# Patient Record
Sex: FEMALE | Race: ASIAN | NOT HISPANIC OR LATINO | URBAN - METROPOLITAN AREA
[De-identification: names, ages, dates, MRNs, and addresses within clinical notes are randomized per-mention and may not be internally consistent; named-entity substitution may affect disease eponyms.]

---

## 2024-02-09 ENCOUNTER — OFFICE (OUTPATIENT)
Dept: URBAN - METROPOLITAN AREA CLINIC 11 | Facility: CLINIC | Age: 24
End: 2024-02-09

## 2024-02-09 VITALS
SYSTOLIC BLOOD PRESSURE: 137 MMHG | SYSTOLIC BLOOD PRESSURE: 163 MMHG | HEART RATE: 91 BPM | HEIGHT: 59 IN | OXYGEN SATURATION: 94 % | DIASTOLIC BLOOD PRESSURE: 108 MMHG | WEIGHT: 163 LBS | DIASTOLIC BLOOD PRESSURE: 100 MMHG

## 2024-02-09 DIAGNOSIS — R63.4 ABNORMAL WEIGHT LOSS: ICD-10-CM

## 2024-02-09 DIAGNOSIS — R10.13 EPIGASTRIC PAIN: ICD-10-CM

## 2024-02-09 DIAGNOSIS — R10.30 LOWER ABDOMINAL PAIN, UNSPECIFIED: ICD-10-CM

## 2024-02-09 DIAGNOSIS — K62.89 OTHER SPECIFIED DISEASES OF ANUS AND RECTUM: ICD-10-CM

## 2024-02-09 DIAGNOSIS — K92.1 MELENA: ICD-10-CM

## 2024-02-09 DIAGNOSIS — K61.0 ANAL ABSCESS: ICD-10-CM

## 2024-02-09 DIAGNOSIS — K60.2 ANAL FISSURE, UNSPECIFIED: ICD-10-CM

## 2024-02-09 PROCEDURE — 99204 OFFICE O/P NEW MOD 45 MIN: CPT | Performed by: STUDENT IN AN ORGANIZED HEALTH CARE EDUCATION/TRAINING PROGRAM

## 2024-02-09 RX ORDER — SODIUM PICOSULFATE, MAGNESIUM OXIDE, AND ANHYDROUS CITRIC ACID 10; 3.5; 12 MG/160ML; G/160ML; G/160ML
LIQUID ORAL
Qty: 350 | Refills: 0 | Status: COMPLETED
Start: 2024-02-09 | End: 2024-02-29

## 2024-02-09 RX ORDER — FAMOTIDINE 20 MG/1
TABLET, FILM COATED ORAL
Qty: 28 | Refills: 0 | Status: COMPLETED
Start: 2024-02-09 | End: 2024-02-29

## 2024-02-09 NOTE — SERVICEHPINOTES
Ms. Nati Mayen is a  23-year-old female with no significant past medical history who presents to gastro 1 with multiple GI issues including anorectal pain, bleeding, early satiety, diarrhea, upper and lower abdominal pain.
jenny
br clinic visit 02/09/2024: 
br the patient reports that ever since Christmas she has had upper and lower abdominal pain as well as diarrhea after every time she eats.  She has lost 10 pounds unintentionally.  She endorses nausea but no vomiting, early satiety.  She was started on pantoprazole for the last 3 weeks which has not helped much.  She also failed using hyoscyamine by her primary care doctor.  She also reports her most distressing symptom today is anorectal pain that is worse at night over the last 3 days.  She also endorses blood in her stool every once in a while since Christmas, diarrhea after every meal as well as upper abdominal and lower abdominal cramping.  She does not take any significant NSAIDs.  No family history of GI malignancies or liver disease.  No previous abdominal surgeries.  She does not smoke, drink alcohol, or use recreational drugs.  She was on antibiotics when her symptoms 1st started but those have since been discontinued.

## 2024-02-09 NOTE — SERVICENOTES
patient has multiple issues going on today.  her most distressing symptom is anorectal pain that I think is probably from an anal fissure with a superficial abscess or possibly just the fissure itself.  I am going to treat her empirically with nitroglycerin topical cream for 8 weeks.  Were also going to schedule her for diagnostic colonoscopy for her hematochezia and anorectal pain.  I told her that if she develops fevers or chills to call us or go to the emergency room as it could mean a worsening abscess.  I am going to check a CT scan of the pelvis to evaluate for any underlying perianal abscess.  Will also do an EGD due to her upper abdominal and epigastric pain and early satiety in the setting of symptoms despite pantoprazole use.  Will switch her to famotidine 2 weeks prior to the procedure.  I discussed with her the risks and benefits of the procedure including bleeding, infection, anesthesia related complications. Patient agrees proceed with the planned procedure.

## 2024-02-10 ENCOUNTER — INPATIENT HOSPITAL (OUTPATIENT)
Dept: URBAN - METROPOLITAN AREA HOSPITAL 95 | Facility: HOSPITAL | Age: 24
End: 2024-02-10

## 2024-02-10 ENCOUNTER — EMERGENCY ROOM – HOSPITAL (OUTPATIENT)
Dept: URBAN - METROPOLITAN AREA HOSPITAL 96 | Facility: HOSPITAL | Age: 24
End: 2024-02-10
Payer: COMMERCIAL

## 2024-02-10 DIAGNOSIS — K51.00 ULCERATIVE (CHRONIC) PANCOLITIS WITHOUT COMPLICATIONS: ICD-10-CM

## 2024-02-10 DIAGNOSIS — A04.72 ENTEROCOLITIS DUE TO CLOSTRIDIUM DIFFICILE, NOT SPECIFIED AS: ICD-10-CM

## 2024-02-10 PROCEDURE — 99222 1ST HOSP IP/OBS MODERATE 55: CPT | Performed by: INTERNAL MEDICINE

## 2024-02-11 ENCOUNTER — INPATIENT HOSPITAL (OUTPATIENT)
Dept: URBAN - METROPOLITAN AREA HOSPITAL 95 | Facility: HOSPITAL | Age: 24
End: 2024-02-11

## 2024-02-11 DIAGNOSIS — K52.9 NONINFECTIVE GASTROENTERITIS AND COLITIS, UNSPECIFIED: ICD-10-CM

## 2024-02-11 DIAGNOSIS — A04.72 ENTEROCOLITIS DUE TO CLOSTRIDIUM DIFFICILE, NOT SPECIFIED AS: ICD-10-CM

## 2024-02-11 PROCEDURE — 99232 SBSQ HOSP IP/OBS MODERATE 35: CPT | Performed by: INTERNAL MEDICINE

## 2024-02-12 ENCOUNTER — INPATIENT HOSPITAL (OUTPATIENT)
Dept: URBAN - METROPOLITAN AREA HOSPITAL 95 | Facility: HOSPITAL | Age: 24
End: 2024-02-12

## 2024-02-12 DIAGNOSIS — A04.72 ENTEROCOLITIS DUE TO CLOSTRIDIUM DIFFICILE, NOT SPECIFIED AS: ICD-10-CM

## 2024-02-12 DIAGNOSIS — K52.9 NONINFECTIVE GASTROENTERITIS AND COLITIS, UNSPECIFIED: ICD-10-CM

## 2024-02-12 PROCEDURE — 99231 SBSQ HOSP IP/OBS SF/LOW 25: CPT | Performed by: INTERNAL MEDICINE

## 2024-02-13 ENCOUNTER — INPATIENT HOSPITAL (OUTPATIENT)
Dept: URBAN - METROPOLITAN AREA HOSPITAL 95 | Facility: HOSPITAL | Age: 24
End: 2024-02-13

## 2024-02-13 DIAGNOSIS — A04.72 ENTEROCOLITIS DUE TO CLOSTRIDIUM DIFFICILE, NOT SPECIFIED AS: ICD-10-CM

## 2024-02-13 DIAGNOSIS — K52.9 NONINFECTIVE GASTROENTERITIS AND COLITIS, UNSPECIFIED: ICD-10-CM

## 2024-02-13 PROCEDURE — 99231 SBSQ HOSP IP/OBS SF/LOW 25: CPT | Performed by: INTERNAL MEDICINE

## 2024-02-14 ENCOUNTER — INPATIENT HOSPITAL (OUTPATIENT)
Dept: URBAN - METROPOLITAN AREA HOSPITAL 95 | Facility: HOSPITAL | Age: 24
End: 2024-02-14

## 2024-02-14 DIAGNOSIS — A04.72 ENTEROCOLITIS DUE TO CLOSTRIDIUM DIFFICILE, NOT SPECIFIED AS: ICD-10-CM

## 2024-02-14 PROCEDURE — 99231 SBSQ HOSP IP/OBS SF/LOW 25: CPT | Performed by: INTERNAL MEDICINE

## 2024-02-29 ENCOUNTER — OFFICE (OUTPATIENT)
Dept: URBAN - METROPOLITAN AREA CLINIC 11 | Facility: CLINIC | Age: 24
End: 2024-02-29

## 2024-02-29 VITALS
WEIGHT: 163 LBS | HEIGHT: 59 IN | DIASTOLIC BLOOD PRESSURE: 91 MMHG | SYSTOLIC BLOOD PRESSURE: 139 MMHG | OXYGEN SATURATION: 99 % | HEART RATE: 86 BPM

## 2024-02-29 DIAGNOSIS — R11.0 NAUSEA: ICD-10-CM

## 2024-02-29 DIAGNOSIS — D50.9 IRON DEFICIENCY ANEMIA, UNSPECIFIED: ICD-10-CM

## 2024-02-29 DIAGNOSIS — Z86.19 PERSONAL HISTORY OF OTHER INFECTIOUS AND PARASITIC DISEASES: ICD-10-CM

## 2024-02-29 DIAGNOSIS — R10.32 LEFT LOWER QUADRANT PAIN: ICD-10-CM

## 2024-02-29 DIAGNOSIS — K60.2 ANAL FISSURE, UNSPECIFIED: ICD-10-CM

## 2024-02-29 DIAGNOSIS — R93.3 ABNORMAL FINDINGS ON DIAGNOSTIC IMAGING OF OTHER PARTS OF DI: ICD-10-CM

## 2024-02-29 DIAGNOSIS — K52.89 OTHER SPECIFIED NONINFECTIVE GASTROENTERITIS AND COLITIS: ICD-10-CM

## 2024-02-29 PROCEDURE — 99214 OFFICE O/P EST MOD 30 MIN: CPT | Performed by: STUDENT IN AN ORGANIZED HEALTH CARE EDUCATION/TRAINING PROGRAM

## 2024-02-29 RX ORDER — HYOSCYAMINE SULFATE 0.12 MG/1
TABLET ORAL; SUBLINGUAL
Qty: 30 | Refills: 3 | Status: COMPLETED
Start: 2024-02-29 | End: 2024-05-29

## 2024-02-29 RX ORDER — DICYCLOMINE HYDROCHLORIDE 20 MG/1
80 TABLET ORAL
Qty: 30 | Refills: 0 | Status: COMPLETED
End: 2024-02-29

## 2024-02-29 RX ORDER — ONDANSETRON 4 MG/1
TABLET, ORALLY DISINTEGRATING ORAL
Qty: 20 | Refills: 1 | Status: COMPLETED
Start: 2024-02-29 | End: 2024-05-29

## 2024-02-29 NOTE — SERVICEHPINOTES
Ms. Nati Mayen is a 23-year-old female with no significant past medical history who initially presented to gastro 1 with multiple GI issues including anorectal pain, bleeding, early satiety, diarrhea, upper and lower abdominal pain.clinic visit 02/09/2024: brthe patient reports that ever since Christmas she has had upper and lower abdominal pain as well as diarrhea after every time she eats.  She has lost 10 pounds unintentionally.  She endorses nausea but no vomiting, early satiety.  She was started on pantoprazole for the last 3 weeks which has not helped much.  She also failed using hyoscyamine by her primary care doctor.  She also reports her most distressing symptom today is anorectal pain that is worse at night over the last 3 days.  She also endorses blood in her stool every once in a while since Christmas, diarrhea after every meal as well as upper abdominal and lower abdominal cramping.  She does not take any significant NSAIDs.  No family history of GI malignancies or liver disease.  No previous abdominal surgeries.  She does not smoke, drink alcohol, or use recreational drugs.  She was on antibiotics when her symptoms 1st started but those have since been discontinued. 
br
br     Clinic visit 02/29/2024:
br 
 patient reports that after I saw her in clinic on February 9th she went to the emergency room at [facility].  There she had a CT scan which showed pan colitis but no abscess.  She was seen by the GI service there and had stool testing done which was positive for C diff.  She was admitted for 5 days and discharged after her symptoms improved.  Since being in the hospital she has had improvement overall in her symptoms although she is still having about 6 loose bowel movements per day, less blood, still has some cramping but it is overall improved.  She has having no fevers or chills.  Some occasional nausea.  She stopped using the anal fissure cream that I gave her last time and reports that she has not having significant pain when she has a bowel movement.  She was also found to have iron-deficiency anemia in the hospital with a hemoglobin around 7 and low ferritin and iron levels so she was given IV iron.  She was using Bentyl around the clock after discharge but had no improvement in her symptoms.  She felt like a PPI is not helping her so she stopped taking it.

## 2024-02-29 NOTE — SERVICENOTES
Patient has recent C diff infection that has been treated.  Patient's abdominal cramping and overall symptoms are much better but she is still having 6 bowel movements per day.  I am going to switch her from Bentyl to p.r.n. hyoscyamine for abdominal cramping and Zofran as needed for nausea.  She can stop the PPI since it does not seem to be helping and may increase her risk of recurrent C diff.  Return to clinic in 3 months.  We will reschedule her EGD and colonoscopy to give her about 6 weeks after she was completed treatment for C diff to allow healing.  I recommended that if her anal fissure recurs then she should start using the nitroglycerin cream again.  I personally reviewed her outside medical records from [facility] for her visit today.  All questions addressed.  I told her to call us back if her diarrhea is worsening, bleeding is worsening, or cramping is worsening as we may need to retest her for C diff and treat her for a more prolonged course or with fidaxomicin if needed.

## 2024-03-26 ENCOUNTER — AMBULATORY SURGICAL CENTER (OUTPATIENT)
Dept: URBAN - METROPOLITAN AREA SURGERY 3 | Facility: SURGERY | Age: 24
End: 2024-03-26
Payer: COMMERCIAL

## 2024-03-26 ENCOUNTER — AMBULATORY SURGICAL CENTER (OUTPATIENT)
Dept: URBAN - METROPOLITAN AREA SURGERY 3 | Facility: SURGERY | Age: 24
End: 2024-03-26

## 2024-03-26 ENCOUNTER — OFFICE (OUTPATIENT)
Dept: URBAN - METROPOLITAN AREA PATHOLOGY 12 | Facility: PATHOLOGY | Age: 24
End: 2024-03-26

## 2024-03-26 VITALS
SYSTOLIC BLOOD PRESSURE: 126 MMHG | HEIGHT: 59 IN | RESPIRATION RATE: 16 BRPM | DIASTOLIC BLOOD PRESSURE: 87 MMHG | OXYGEN SATURATION: 98 % | SYSTOLIC BLOOD PRESSURE: 126 MMHG | TEMPERATURE: 98.3 F | HEART RATE: 88 BPM | DIASTOLIC BLOOD PRESSURE: 80 MMHG | SYSTOLIC BLOOD PRESSURE: 126 MMHG | HEART RATE: 87 BPM | SYSTOLIC BLOOD PRESSURE: 140 MMHG | DIASTOLIC BLOOD PRESSURE: 97 MMHG | HEART RATE: 85 BPM | OXYGEN SATURATION: 98 % | DIASTOLIC BLOOD PRESSURE: 86 MMHG | DIASTOLIC BLOOD PRESSURE: 90 MMHG | HEART RATE: 87 BPM | SYSTOLIC BLOOD PRESSURE: 123 MMHG | HEART RATE: 87 BPM | TEMPERATURE: 97.6 F | RESPIRATION RATE: 18 BRPM | SYSTOLIC BLOOD PRESSURE: 131 MMHG | TEMPERATURE: 97.6 F | SYSTOLIC BLOOD PRESSURE: 140 MMHG | HEART RATE: 88 BPM | SYSTOLIC BLOOD PRESSURE: 129 MMHG | TEMPERATURE: 98.3 F | WEIGHT: 160 LBS | OXYGEN SATURATION: 100 % | SYSTOLIC BLOOD PRESSURE: 129 MMHG | DIASTOLIC BLOOD PRESSURE: 87 MMHG | RESPIRATION RATE: 18 BRPM | SYSTOLIC BLOOD PRESSURE: 129 MMHG | OXYGEN SATURATION: 99 % | DIASTOLIC BLOOD PRESSURE: 80 MMHG | OXYGEN SATURATION: 99 % | SYSTOLIC BLOOD PRESSURE: 131 MMHG | RESPIRATION RATE: 22 BRPM | HEIGHT: 59 IN | DIASTOLIC BLOOD PRESSURE: 86 MMHG | DIASTOLIC BLOOD PRESSURE: 97 MMHG | RESPIRATION RATE: 22 BRPM | HEART RATE: 88 BPM | HEART RATE: 85 BPM | RESPIRATION RATE: 16 BRPM | HEART RATE: 85 BPM | DIASTOLIC BLOOD PRESSURE: 90 MMHG | OXYGEN SATURATION: 99 % | DIASTOLIC BLOOD PRESSURE: 97 MMHG | WEIGHT: 160 LBS | DIASTOLIC BLOOD PRESSURE: 90 MMHG | RESPIRATION RATE: 16 BRPM | RESPIRATION RATE: 18 BRPM | HEIGHT: 59 IN | SYSTOLIC BLOOD PRESSURE: 123 MMHG | TEMPERATURE: 98.3 F | OXYGEN SATURATION: 100 % | WEIGHT: 160 LBS | SYSTOLIC BLOOD PRESSURE: 140 MMHG | OXYGEN SATURATION: 100 % | RESPIRATION RATE: 22 BRPM | DIASTOLIC BLOOD PRESSURE: 86 MMHG | DIASTOLIC BLOOD PRESSURE: 87 MMHG | SYSTOLIC BLOOD PRESSURE: 123 MMHG | OXYGEN SATURATION: 98 % | TEMPERATURE: 97.6 F | DIASTOLIC BLOOD PRESSURE: 80 MMHG | SYSTOLIC BLOOD PRESSURE: 131 MMHG

## 2024-03-26 DIAGNOSIS — K31.89 OTHER DISEASES OF STOMACH AND DUODENUM: ICD-10-CM

## 2024-03-26 DIAGNOSIS — K92.1 MELENA: ICD-10-CM

## 2024-03-26 DIAGNOSIS — K52.89 OTHER SPECIFIED NONINFECTIVE GASTROENTERITIS AND COLITIS: ICD-10-CM

## 2024-03-26 DIAGNOSIS — R19.7 DIARRHEA, UNSPECIFIED: ICD-10-CM

## 2024-03-26 DIAGNOSIS — R93.3 ABNORMAL FINDINGS ON DIAGNOSTIC IMAGING OF OTHER PARTS OF DI: ICD-10-CM

## 2024-03-26 DIAGNOSIS — K29.50 UNSPECIFIED CHRONIC GASTRITIS WITHOUT BLEEDING: ICD-10-CM

## 2024-03-26 DIAGNOSIS — K64.4 RESIDUAL HEMORRHOIDAL SKIN TAGS: ICD-10-CM

## 2024-03-26 DIAGNOSIS — R10.13 EPIGASTRIC PAIN: ICD-10-CM

## 2024-03-26 DIAGNOSIS — K60.1 CHRONIC ANAL FISSURE: ICD-10-CM

## 2024-03-26 PROBLEM — K63.89 OTHER SPECIFIED DISEASES OF INTESTINE: Status: ACTIVE | Noted: 2024-03-26

## 2024-03-26 PROBLEM — K25.9 GASTRIC ULCER, UNSPECIFIED AS ACUTE OR CHRONIC, WITHOUT HEMO: Status: ACTIVE | Noted: 2024-03-26

## 2024-03-26 PROBLEM — K63.3 ULCER OF INTESTINE: Status: ACTIVE | Noted: 2024-03-26

## 2024-03-26 PROBLEM — K92.2 GASTROINTESTINAL HEMORRHAGE, UNSPECIFIED: Status: ACTIVE | Noted: 2024-03-26

## 2024-03-26 PROCEDURE — 45380 COLONOSCOPY AND BIOPSY: CPT | Performed by: STUDENT IN AN ORGANIZED HEALTH CARE EDUCATION/TRAINING PROGRAM

## 2024-03-26 PROCEDURE — 43239 EGD BIOPSY SINGLE/MULTIPLE: CPT | Mod: 51 | Performed by: STUDENT IN AN ORGANIZED HEALTH CARE EDUCATION/TRAINING PROGRAM

## 2024-03-26 PROCEDURE — 88342 IMHCHEM/IMCYTCHM 1ST ANTB: CPT | Performed by: STUDENT IN AN ORGANIZED HEALTH CARE EDUCATION/TRAINING PROGRAM

## 2024-03-26 PROCEDURE — 88341 IMHCHEM/IMCYTCHM EA ADD ANTB: CPT | Performed by: STUDENT IN AN ORGANIZED HEALTH CARE EDUCATION/TRAINING PROGRAM

## 2024-03-26 PROCEDURE — 88305 TISSUE EXAM BY PATHOLOGIST: CPT | Performed by: STUDENT IN AN ORGANIZED HEALTH CARE EDUCATION/TRAINING PROGRAM

## 2024-03-26 PROCEDURE — 88313 SPECIAL STAINS GROUP 2: CPT | Performed by: STUDENT IN AN ORGANIZED HEALTH CARE EDUCATION/TRAINING PROGRAM

## 2024-03-26 NOTE — SERVICENOTES
colon scope in 1:21
cecum 1:25
scope out 1:37
May need antibx vs UC treatment, may need PPI if no cdiff or H2 blocker for gastritis depending on pathology

## 2024-03-26 NOTE — SERVICEHPINOTES
Ms. Nati Mayen is a 23-year-old female with no significant past medical history who initially presented to gastro 1 with multiple GI issues including anorectal pain, bleeding, early satiety, diarrhea, upper and lower abdominal pain.clinic visit 02/09/2024: brthe patient reports that ever since Christmas she has had upper and lower abdominal pain as well as diarrhea after every time she eats.  She has lost 10 pounds unintentionally.  She endorses nausea but no vomiting, early satiety.  She was started on pantoprazole for the last 3 weeks which has not helped much.  She also failed using hyoscyamine by her primary care doctor.  She also reports her most distressing symptom today is anorectal pain that is worse at night over the last 3 days.  She also endorses blood in her stool every once in a while since Christmas, diarrhea after every meal as well as upper abdominal and lower abdominal cramping.  She does not take any significant NSAIDs.  No family history of GI malignancies or liver disease.  No previous abdominal surgeries.  She does not smoke, drink alcohol, or use recreational drugs.  She was on antibiotics when her symptoms 1st started but those have since been discontinued.  Clinic visit 02/29/2024:br patient reports that after I saw her in clinic on February 9th she went to the emergency room at [facility].  There she had a CT scan which showed pan colitis but no abscess.  She was seen by the GI service there and had stool testing done which was positive for C diff.  She was admitted for 5 days and discharged after her symptoms improved.  Since being in the hospital she has had improvement overall in her symptoms although she is still having about 6 loose bowel movements per day, less blood, still has some cramping but it is overall improved.  She has having no fevers or chills.  Some occasional nausea.  She stopped using the anal fissure cream that I gave her last time and reports that she has not having significant pain when she has a bowel movement.  She was also found to have iron-deficiency anemia in the hospital with a hemoglobin around 7 and low ferritin and iron levels so she was given IV iron.  She was using Bentyl around the clock after discharge but had no improvement in her symptoms.  She felt like a PPI is not helping her so she stopped taking it. 
jenny alvarado   EGD/colonoscopy 3/26/24:  
br
Went on her cruise, felt better and diarrhea was less as she ate more, has been taking zofran and levsin around the clock on the quique. Father (who is also my patient) had emergency surgery for strangulated hernia right before the cruise and as a result, she has been under a lot of stress. Still has some low level LLQ pain.

## 2024-03-26 NOTE — SERVICEHPINOTES
Ms. Nati Mayen is a 23-year-old female with no significant past medical history who initially presented to gastro 1 with multiple GI issues including anorectal pain, bleeding, early satiety, diarrhea, upper and lower abdominal pain.clinic visit 02/09/2024: brthe patient reports that ever since Christmas she has had upper and lower abdominal pain as well as diarrhea after every time she eats.  She has lost 10 pounds unintentionally.  She endorses nausea but no vomiting, early satiety.  She was started on pantoprazole for the last 3 weeks which has not helped much.  She also failed using hyoscyamine by her primary care doctor.  She also reports her most distressing symptom today is anorectal pain that is worse at night over the last 3 days.  She also endorses blood in her stool every once in a while since Christmas, diarrhea after every meal as well as upper abdominal and lower abdominal cramping.  She does not take any significant NSAIDs.  No family history of GI malignancies or liver disease.  No previous abdominal surgeries.  She does not smoke, drink alcohol, or use recreational drugs.  She was on antibiotics when her symptoms 1st started but those have since been discontinued.  Clinic visit 02/29/2024:br patient reports that after I saw her in clinic on February 9th she went to the emergency room at [facility].  There she had a CT scan which showed pan colitis but no abscess.  She was seen by the GI service there and had stool testing done which was positive for C diff.  She was admitted for 5 days and discharged after her symptoms improved.  Since being in the hospital she has had improvement overall in her symptoms although she is still having about 6 loose bowel movements per day, less blood, still has some cramping but it is overall improved.  She has having no fevers or chills.  Some occasional nausea.  She stopped using the anal fissure cream that I gave her last time and reports that she has not having significant pain when she has a bowel movement.  She was also found to have iron-deficiency anemia in the hospital with a hemoglobin around 7 and low ferritin and iron levels so she was given IV iron.  She was using Bentyl around the clock after discharge but had no improvement in her symptoms.  She felt like a PPI is not helping her so she stopped taking it. 
jenny alvarado   EGD/colonoscopy 3/26/24:  
br
Went on her cruise, felt better and diarrhea was less as she ate more, has been taking zofran and levsin around the clock on the quique. Father (who is also my patient) had emergency surgery for strangulated hernia right before the cruise and as a result, she has been under a lot of stress. Still has some low level LLQ pain.

## 2024-03-26 NOTE — SERVICEHPINOTES
Ms. Nati Mayen is a 23-year-old female with no significant past medical history who initially presented to gastro 1 with multiple GI issues including anorectal pain, bleeding, early satiety, diarrhea, upper and lower abdominal pain.clinic visit 02/09/2024: brthe patient reports that ever since Christmas she has had upper and lower abdominal pain as well as diarrhea after every time she eats.  She has lost 10 pounds unintentionally.  She endorses nausea but no vomiting, early satiety.  She was started on pantoprazole for the last 3 weeks which has not helped much.  She also failed using hyoscyamine by her primary care doctor.  She also reports her most distressing symptom today is anorectal pain that is worse at night over the last 3 days.  She also endorses blood in her stool every once in a while since Christmas, diarrhea after every meal as well as upper abdominal and lower abdominal cramping.  She does not take any significant NSAIDs.  No family history of GI malignancies or liver disease.  No previous abdominal surgeries.  She does not smoke, drink alcohol, or use recreational drugs.  She was on antibiotics when her symptoms 1st started but those have since been discontinued.  Clinic visit 02/29/2024:br patient reports that after I saw her in clinic on February 9th she went to the emergency room at [facility].  There she had a CT scan which showed pan colitis but no abscess.  She was seen by the GI service there and had stool testing done which was positive for C diff.  She was admitted for 5 days and discharged after her symptoms improved.  Since being in the hospital she has had improvement overall in her symptoms although she is still having about 6 loose bowel movements per day, less blood, still has some cramping but it is overall improved.  She has having no fevers or chills.  Some occasional nausea.  She stopped using the anal fissure cream that I gave her last time and reports that she has not having significant pain when she has a bowel movement.  She was also found to have iron-deficiency anemia in the hospital with a hemoglobin around 7 and low ferritin and iron levels so she was given IV iron.  She was using Bentyl around the clock after discharge but had no improvement in her symptoms.  She felt like a PPI is not helping her so she stopped taking it. 
jenny alvarado   EGD/colonoscopy 3/26/24:  
br
Went on her cruise, felt better and diarrhea was less as she ate more, has been taking zofran and levsin around the clock on the quique. Father (who is also my patient) had emergency surgery for strangulated hernia right before the cruise and as a result, she has been under a lot of stress. Still has some low level LLQ pain.

## 2024-04-01 LAB
GASTRO ONE PATHOLOGY: PDF REPORT: (no result)

## 2024-05-29 ENCOUNTER — OFFICE (OUTPATIENT)
Dept: URBAN - METROPOLITAN AREA CLINIC 11 | Facility: CLINIC | Age: 24
End: 2024-05-29

## 2024-05-29 VITALS
HEIGHT: 59 IN | OXYGEN SATURATION: 100 % | HEART RATE: 63 BPM | WEIGHT: 157 LBS | SYSTOLIC BLOOD PRESSURE: 141 MMHG | DIASTOLIC BLOOD PRESSURE: 92 MMHG

## 2024-05-29 DIAGNOSIS — R10.32 LEFT LOWER QUADRANT PAIN: ICD-10-CM

## 2024-05-29 DIAGNOSIS — Z86.19 PERSONAL HISTORY OF OTHER INFECTIOUS AND PARASITIC DISEASES: ICD-10-CM

## 2024-05-29 DIAGNOSIS — D50.9 IRON DEFICIENCY ANEMIA, UNSPECIFIED: ICD-10-CM

## 2024-05-29 PROCEDURE — 99213 OFFICE O/P EST LOW 20 MIN: CPT | Performed by: STUDENT IN AN ORGANIZED HEALTH CARE EDUCATION/TRAINING PROGRAM

## 2024-05-29 NOTE — SERVICEHPINOTES
Ms. Nati Mayen is a 24-year-old female with no significant past medical history who initially presented to gastro  with multiple GI issues including anorectal pain, bleeding, early satiety, diarrhea, upper and lower abdominal pain.clinic visit 02/09/2024: brthe patient reports that ever since Christmas she has had upper and lower abdominal pain as well as diarrhea after every time she eats.  She has lost 10 pounds unintentionally.  She endorses nausea but no vomiting, early satiety.  She was started on pantoprazole for the last 3 weeks which has not helped much.  She also failed using hyoscyamine by her primary care doctor.  She also reports her most distressing symptom today is anorectal pain that is worse at night over the last 3 days.  She also endorses blood in her stool every once in a while since Christmas, diarrhea after every meal as well as upper abdominal and lower abdominal cramping.  She does not take any significant NSAIDs.  No family history of GI malignancies or liver disease.  No previous abdominal surgeries.  She does not smoke, drink alcohol, or use recreational drugs.  She was on antibiotics when her symptoms 1st started but those have since been discontinued.  Clinic visit 02/29/2024:br patient reports that after I saw her in clinic on February 9th she went to the emergency room at [facility].  There she had a CT scan which showed pan colitis but no abscess.  She was seen by the GI service there and had stool testing done which was positive for C diff.  She was admitted for 5 days and discharged after her symptoms improved.  Since being in the hospital she has had improvement overall in her symptoms although she is still having about 6 loose bowel movements per day, less blood, still has some cramping but it is overall improved.  She has having no fevers or chills.  Some occasional nausea.  She stopped using the anal fissure cream that I gave her last time and reports that she has not having significant pain when she has a bowel movement.  She was also found to have iron-deficiency anemia in the hospital with a hemoglobin around 7 and low ferritin and iron levels so she was given IV iron.  She was using Bentyl around the clock after discharge but had no improvement in her symptoms.  She felt like a PPI is not helping her so she stopped taking it.EGD/colonoscopy 3/26/24:brWent on her cruise, felt better and diarrhea was less as she ate more, has been taking zofran and levsin around the clock on the quique. Father (who is also my patient) had emergency surgery for strangulated hernia right before the cruise and as a result, she has been under a lot of stress. Still has some low level LLQ pain. 
jenny alvarado     Clinic visit 5/29/2024:
br 
  Patient overall feels so much better now.  She reports that she has not having any abdominal pain, bowel movements are once a day and solid and normal.  No blood in the stool or black tarry stool.  No early satiety.  Her appetite is back to normal.  Sometimes she will have some lower abdominal pain if she eats something spicy or greasy but she reports that is very minimal.  Weight has been stable.  She feels back to her normal self.  She has not on any medications anymore.  She never had the C diff test performed that I ordered several months ago but based on her symptoms it appears that it is resolved.jenny

## 2024-05-29 NOTE — SERVICENOTES
patient is doing remarkably well today and she has having no GI issues.  Pain is resolved, appetite is good, bowel movements are normal.  She reports that she feels better than she is ever felt.  She feels back to normal.  Based on her symptomatology it seems that her colonoscopy findings were more likely related to her recent C diff infection rather than true ulcerative colitis.  However if her symptoms come back then we may need to investigate further for and treat for inflammatory bowel disease.  I told her to return to clinic in 1 year or sooner if needed.  Given her history of microcytic anemia I am going to check her blood counts today and iron levels and she already has some iron pills that she will wait to take until I tell her.  All questions addressed.

## 2025-02-26 ENCOUNTER — OFFICE (OUTPATIENT)
Dept: URBAN - METROPOLITAN AREA CLINIC 11 | Facility: CLINIC | Age: 25
End: 2025-02-26

## 2025-02-26 VITALS
SYSTOLIC BLOOD PRESSURE: 146 MMHG | DIASTOLIC BLOOD PRESSURE: 88 MMHG | OXYGEN SATURATION: 100 % | HEIGHT: 59 IN | SYSTOLIC BLOOD PRESSURE: 136 MMHG | WEIGHT: 165 LBS | DIASTOLIC BLOOD PRESSURE: 92 MMHG | HEART RATE: 70 BPM

## 2025-02-26 DIAGNOSIS — K92.1 MELENA: ICD-10-CM

## 2025-02-26 DIAGNOSIS — K60.1 CHRONIC ANAL FISSURE: ICD-10-CM

## 2025-02-26 DIAGNOSIS — K64.4 RESIDUAL HEMORRHOIDAL SKIN TAGS: ICD-10-CM

## 2025-02-26 DIAGNOSIS — R19.7 DIARRHEA, UNSPECIFIED: ICD-10-CM

## 2025-02-26 DIAGNOSIS — K62.89 OTHER SPECIFIED DISEASES OF ANUS AND RECTUM: ICD-10-CM

## 2025-02-26 DIAGNOSIS — R10.30 LOWER ABDOMINAL PAIN, UNSPECIFIED: ICD-10-CM

## 2025-02-26 PROCEDURE — 99214 OFFICE O/P EST MOD 30 MIN: CPT

## 2025-02-26 RX ORDER — ONDANSETRON 4 MG/1
TABLET, ORALLY DISINTEGRATING ORAL
Qty: 90 | Refills: 1 | Status: ACTIVE
Start: 2025-02-26

## 2025-02-26 RX ORDER — HYOSCYAMINE SULFATE 0.12 MG/1
TABLET ORAL; SUBLINGUAL
Qty: 90 | Refills: 1 | Status: ACTIVE
Start: 2025-02-26

## 2025-02-27 LAB
C-REACTIVE PROTEIN, QUANT: 1 MG/L (ref 0–10)
CBC, PLATELET, NO DIFFERENTIAL: HEMATOCRIT: 37.7 % (ref 34–46.6)
CBC, PLATELET, NO DIFFERENTIAL: HEMOGLOBIN: 11.8 G/DL (ref 11.1–15.9)
CBC, PLATELET, NO DIFFERENTIAL: MCH: 25.8 PG — LOW (ref 26.6–33)
CBC, PLATELET, NO DIFFERENTIAL: MCHC: 31.3 G/DL — LOW (ref 31.5–35.7)
CBC, PLATELET, NO DIFFERENTIAL: MCV: 83 FL (ref 79–97)
CBC, PLATELET, NO DIFFERENTIAL: PLATELETS: 507 X10E3/UL — HIGH (ref 150–450)
CBC, PLATELET, NO DIFFERENTIAL: RBC: 4.57 X10E6/UL (ref 3.77–5.28)
CBC, PLATELET, NO DIFFERENTIAL: RDW: 16.6 % — HIGH (ref 11.7–15.4)
CBC, PLATELET, NO DIFFERENTIAL: WBC: 8.7 X10E3/UL (ref 3.4–10.8)
SEDIMENTATION RATE-WESTERGREN: 69 MM/HR — HIGH (ref 0–32)

## 2025-03-19 LAB
CALPROTECTIN, FECAL: 5860 UG/G — HIGH (ref 0–120)
GI PROFILE, STOOL, PCR: ADENOVIRUS F 40/41: NOT DETECTED
GI PROFILE, STOOL, PCR: ASTROVIRUS: NOT DETECTED
GI PROFILE, STOOL, PCR: C DIFFICILE TOXIN A/B: NOT DETECTED
GI PROFILE, STOOL, PCR: CAMPYLOBACTER: NOT DETECTED
GI PROFILE, STOOL, PCR: CRYPTOSPORIDIUM: NOT DETECTED
GI PROFILE, STOOL, PCR: CYCLOSPORA CAYETANENSIS: NOT DETECTED
GI PROFILE, STOOL, PCR: E COLI O157: (no result)
GI PROFILE, STOOL, PCR: ENTAMOEBA HISTOLYTICA: NOT DETECTED
GI PROFILE, STOOL, PCR: ENTEROAGGREGATIVE E COLI: NOT DETECTED
GI PROFILE, STOOL, PCR: ENTEROPATHOGENIC E COLI: NOT DETECTED
GI PROFILE, STOOL, PCR: ENTEROTOXIGENIC E COLI: NOT DETECTED
GI PROFILE, STOOL, PCR: GIARDIA LAMBLIA: NOT DETECTED
GI PROFILE, STOOL, PCR: NOROVIRUS GI/GII: NOT DETECTED
GI PROFILE, STOOL, PCR: PLESIOMONAS SHIGELLOIDES: NOT DETECTED
GI PROFILE, STOOL, PCR: ROTAVIRUS A: NOT DETECTED
GI PROFILE, STOOL, PCR: SALMONELLA: NOT DETECTED
GI PROFILE, STOOL, PCR: SAPOVIRUS: NOT DETECTED
GI PROFILE, STOOL, PCR: SHIGA-TOXIN-PRODUCING E COLI: NOT DETECTED
GI PROFILE, STOOL, PCR: SHIGELLA/ENTEROINVASIVE E COLI: NOT DETECTED
GI PROFILE, STOOL, PCR: VIBRIO CHOLERAE: NOT DETECTED
GI PROFILE, STOOL, PCR: VIBRIO: NOT DETECTED
GI PROFILE, STOOL, PCR: YERSINIA ENTEROCOLITICA: NOT DETECTED

## 2025-04-11 ENCOUNTER — INPATIENT HOSPITAL (OUTPATIENT)
Dept: URBAN - METROPOLITAN AREA HOSPITAL 97 | Facility: HOSPITAL | Age: 25
End: 2025-04-11
Payer: COMMERCIAL

## 2025-04-11 DIAGNOSIS — K51.90 ULCERATIVE COLITIS, UNSPECIFIED, WITHOUT COMPLICATIONS: ICD-10-CM

## 2025-04-11 DIAGNOSIS — K85.90 ACUTE PANCREATITIS WITHOUT NECROSIS OR INFECTION, UNSPECIFIE: ICD-10-CM

## 2025-04-11 PROCEDURE — 99214 OFFICE O/P EST MOD 30 MIN: CPT | Mod: SA

## 2025-04-11 PROCEDURE — 99222 1ST HOSP IP/OBS MODERATE 55: CPT | Mod: SA

## 2025-04-12 ENCOUNTER — ON CAMPUS - OUTPATIENT (OUTPATIENT)
Dept: URBAN - METROPOLITAN AREA HOSPITAL 97 | Facility: HOSPITAL | Age: 25
End: 2025-04-12
Payer: COMMERCIAL

## 2025-04-12 DIAGNOSIS — K51.90 ULCERATIVE COLITIS, UNSPECIFIED, WITHOUT COMPLICATIONS: ICD-10-CM

## 2025-04-12 DIAGNOSIS — K85.90 ACUTE PANCREATITIS WITHOUT NECROSIS OR INFECTION, UNSPECIFIE: ICD-10-CM

## 2025-04-12 PROCEDURE — 99213 OFFICE O/P EST LOW 20 MIN: CPT | Performed by: INTERNAL MEDICINE

## 2025-04-17 ENCOUNTER — OFFICE (OUTPATIENT)
Dept: URBAN - METROPOLITAN AREA CLINIC 11 | Facility: CLINIC | Age: 25
End: 2025-04-17
Payer: COMMERCIAL

## 2025-04-17 VITALS — HEIGHT: 59 IN | SYSTOLIC BLOOD PRESSURE: 129 MMHG | WEIGHT: 154 LBS | DIASTOLIC BLOOD PRESSURE: 94 MMHG

## 2025-04-17 DIAGNOSIS — K51.90 ULCERATIVE COLITIS, UNSPECIFIED, WITHOUT COMPLICATIONS: ICD-10-CM

## 2025-04-17 DIAGNOSIS — R11.0 NAUSEA: ICD-10-CM

## 2025-04-17 DIAGNOSIS — K85.90 ACUTE PANCREATITIS WITHOUT NECROSIS OR INFECTION, UNSPECIFIE: ICD-10-CM

## 2025-04-17 DIAGNOSIS — R09.A2 FOREIGN BODY SENSATION, THROAT: ICD-10-CM

## 2025-04-17 PROCEDURE — 99214 OFFICE O/P EST MOD 30 MIN: CPT

## 2025-04-17 RX ORDER — ONDANSETRON 8 MG/1
TABLET, ORALLY DISINTEGRATING ORAL
Qty: 90 | Refills: 1 | Status: ACTIVE
Start: 2025-02-26

## 2025-04-17 NOTE — SERVICEHPINOTES
Ms. Nati Mayen is a 24-year-old female with no significant past medical history who initially presented to gastro  with multiple GI issues including anorectal pain, bleeding, early satiety, diarrhea, upper and lower abdominal pain.clinic visit 02/09/2024:brthe patient reports that ever since Christmas she has had upper and lower abdominal pain as well as diarrhea after every time she eats. She has lost 10 pounds unintentionally. She endorses nausea but no vomiting, early satiety. She was started on pantoprazole for the last 3 weeks which has not helped much. She also failed using hyoscyamine by her primary care doctor. She also reports her most distressing symptom today is anorectal pain that is worse at night over the last 3 days. She also endorses blood in her stool every once in a while since Christmas, diarrhea after every meal as well as upper abdominal and lower abdominal cramping. She does not take any significant NSAIDs. No family history of GI malignancies or liver disease. No previous abdominal surgeries. She does not smoke, drink alcohol, or use recreational drugs. She was on antibiotics when her symptoms 1st started but those have since been discontinued.Clinic visit 02/29/2024:brpatient reports that after I saw her in clinic on February 9th she went to the emergency room at [facility]. There she had a CT scan which showed pan colitis but no abscess. She was seen by the GI service there and had stool testing done which was positive for C diff. She was admitted for 5 days and discharged after her symptoms improved. Since being in the hospital she has had improvement overall in her symptoms although she is still having about 6 loose bowel movements per day, less blood, still has some cramping but it is overall improved. She has having no fevers or chills. Some occasional nausea. She stopped using the anal fissure cream that I gave her last time and reports that she has not having significant pain when she has a bowel movement. She was also found to have iron-deficiency anemia in the hospital with a hemoglobin around 7 and low ferritin and iron levels so she was given IV iron. She was using Bentyl around the clock after discharge but had no improvement in her symptoms. She felt like a PPI is not helping her so she stopped taking it.EGD/colonoscopy 3/26/24:brWent on her cruise, felt better and diarrhea was less as she ate more, has been taking zofran and levsin around the clock on the quique. Father (who is also my patient) had emergency surgery for strangulated hernia right before the cruise and as a result, she has been under a lot of stress. Still has some low level LLQ pain.Clinic visit 5/29/2024:brPatient overall feels so much better now. She reports that she has not having any abdominal pain, bowel movements are once a day and solid and normal. No blood in the stool or black tarry stool. No early satiety. Her appetite is back to normal. Sometimes she will have some lower abdominal pain if she eats something spicy or greasy but she reports that is very minimal. Weight has been stable. She feels back to her normal self. She has not on any medications anymore. She never had the C diff test performed that I ordered several months ago but based on her symptoms it appears that it is resolved.Clinic Visit 2/26/2025:brThe patient began experiencing increased frequency of bowel movements about a month ago, accompanied by a sensation of pressure in the lower abdomen. Her stools are often liquid, though occasionally more solid, and she experiences urgency with bowel movements, sometimes resulting in only blood being passed. Two weeks ago, she noticed bright red blood during bowel movements, which are sometimes painful. Her abdominal pain is localized to the lower abdomen and sometimes improves after straining during bowel movements. She has a history of an anal fissure, which previously caused pain, and she is concerned it may still be an issue. Abdominal cramping has returned, particularly after eating, though not consistently. She had a previous episode of C. diff infection, which was treated with antibiotics, and she was symptom-free until about a month ago. She recently had contact with another patient who had C. diff, raising concerns about reinfection. She experiences nausea, particularly after eating, but no vomiting, reflux, or trouble swallowing. She exercises regularly, running five times a week, but notes that running sometimes exacerbates the pressure sensation, creating an urgent need to use the bathroom.
jenny alvarado    clinic visit 04/17/2025:
brThe patient was recently hospitalized for pancreatitis, suspected to be related to mesalamine use which she had recently been started on for her ulcerative colitis, which has since been discontinued. Lipase was 600 with normal LFTs.  No further significant stomach issues since discharge. During hospitalization, she experienced severe diarrhea, now resolved. Currently she is still taking a taper budesonide course with 4 weeks remaining. Bowel movements have returned to a more regular pattern, occurring twice daily and mostly formed, with occasional liquid consistency if certain foods are consumed. No blood in stool. She is concerned about the potential need for an ostomy bag in the future. 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 She is concerned about potential weight gain from medications, as she works hard to maintain her weight through regular exercise. She experiences occasional nausea after eating, sometimes severe enough to last the rest of the day. She uses Zofran 4 mg for nausea, but it has not been as effective as before. She denies dysphagia , but reports she experiences a sensation of something stuck in her throat, though it does not interfere with eating or cause coughing. An EGD in March2024 showed a stomach ulcer and gastritis, but a normal esophagus. 
br

## 2025-04-17 NOTE — SERVICENOTES
patient is here for a hospital follow-up of acute pancreatitis that we suspect was related to her mesalamine use for UC.  She has stopped taking mesalamine and reports since being discharged from the hospital she is feeling good.  I will check a lipase to ensure it is coming down.   we talked about the 3 medication options that she can take- Velsipity, Zymfentra, and Skyrizi and she opts to move forward with Skyrizi after talking about the administration and safety.  I will send a task to the infusion or group to get that started. will give her 8 mg Zofran that she can use for nausea since she reports 4 mg does not work very well.  will run an acute hepatitis panel and a QuantiFERON gold since she will be starting a biologic along with a cbc, cmp, esr, and crp.  She does report a feeling of a globus sensation in her throat but at this point said it is not terribly irritating so she does not want to move forward with any testing such as an esophagram.  Questions addressed. Outside records reviewed

## 2025-05-19 ENCOUNTER — OFFICE (OUTPATIENT)
Dept: URBAN - METROPOLITAN AREA CLINIC 11 | Facility: CLINIC | Age: 25
End: 2025-05-19
Payer: COMMERCIAL

## 2025-05-19 VITALS
HEART RATE: 64 BPM | SYSTOLIC BLOOD PRESSURE: 121 MMHG | TEMPERATURE: 98.2 F | HEART RATE: 62 BPM | DIASTOLIC BLOOD PRESSURE: 71 MMHG | DIASTOLIC BLOOD PRESSURE: 60 MMHG | HEART RATE: 67 BPM | TEMPERATURE: 98 F | HEART RATE: 65 BPM | SYSTOLIC BLOOD PRESSURE: 118 MMHG | DIASTOLIC BLOOD PRESSURE: 80 MMHG | RESPIRATION RATE: 18 BRPM | DIASTOLIC BLOOD PRESSURE: 77 MMHG | HEART RATE: 71 BPM | DIASTOLIC BLOOD PRESSURE: 74 MMHG | SYSTOLIC BLOOD PRESSURE: 112 MMHG | SYSTOLIC BLOOD PRESSURE: 120 MMHG | HEART RATE: 68 BPM | HEIGHT: 59 IN | SYSTOLIC BLOOD PRESSURE: 114 MMHG | SYSTOLIC BLOOD PRESSURE: 109 MMHG | DIASTOLIC BLOOD PRESSURE: 73 MMHG | SYSTOLIC BLOOD PRESSURE: 113 MMHG | DIASTOLIC BLOOD PRESSURE: 79 MMHG

## 2025-05-19 DIAGNOSIS — K51.90 ULCERATIVE COLITIS, UNSPECIFIED, WITHOUT COMPLICATIONS: ICD-10-CM

## 2025-05-19 PROCEDURE — 96415 CHEMO IV INFUSION ADDL HR: CPT | Performed by: STUDENT IN AN ORGANIZED HEALTH CARE EDUCATION/TRAINING PROGRAM

## 2025-05-19 PROCEDURE — 96413 CHEMO IV INFUSION 1 HR: CPT | Performed by: STUDENT IN AN ORGANIZED HEALTH CARE EDUCATION/TRAINING PROGRAM

## 2025-05-19 NOTE — SERVICEHPINOTES
See follow up visit from   .  brDate / Results of last TB test  4/17/2025   -   Negative  brLabs and/or Additional orders: CBC &amp CMP due in October.brInsurance authorization: [employer] approved start of Skyrizi 4/28/25-7/28/25 (BB)brPharmacy:  [facility]  brRate of Infusion:  Standard   
br   Infusion order placed on:  5/19/2002   

br   Infusion Consent Date:  5/19/2025

## 2025-05-19 NOTE — SERVICENOTES
Next Kenneth infusion is 6/17/2025 @ 1:00.  Pt. has a new job.  She wants to wait until
she gets her work schedule to make her third infusion appt.
Patient observed for 15 minutes post infusion. 
Patient denies chest pain, shortness of breath or dizziness.  
Handout given and reviewed with patient.
Patient was instructed on common side effects.
Patient verbalized a complete understanding and has no questions.

## 2025-06-17 ENCOUNTER — OFFICE (OUTPATIENT)
Dept: URBAN - METROPOLITAN AREA CLINIC 11 | Facility: CLINIC | Age: 25
End: 2025-06-17
Payer: COMMERCIAL

## 2025-06-17 VITALS
HEART RATE: 67 BPM | SYSTOLIC BLOOD PRESSURE: 114 MMHG | HEART RATE: 66 BPM | HEART RATE: 75 BPM | DIASTOLIC BLOOD PRESSURE: 72 MMHG | DIASTOLIC BLOOD PRESSURE: 81 MMHG | SYSTOLIC BLOOD PRESSURE: 123 MMHG | HEART RATE: 77 BPM | RESPIRATION RATE: 18 BRPM | SYSTOLIC BLOOD PRESSURE: 124 MMHG | SYSTOLIC BLOOD PRESSURE: 115 MMHG | HEART RATE: 70 BPM | DIASTOLIC BLOOD PRESSURE: 82 MMHG | DIASTOLIC BLOOD PRESSURE: 73 MMHG | DIASTOLIC BLOOD PRESSURE: 78 MMHG | TEMPERATURE: 98.3 F | SYSTOLIC BLOOD PRESSURE: 119 MMHG | DIASTOLIC BLOOD PRESSURE: 68 MMHG | HEART RATE: 76 BPM | TEMPERATURE: 98.2 F | HEIGHT: 59 IN | SYSTOLIC BLOOD PRESSURE: 117 MMHG

## 2025-06-17 DIAGNOSIS — K51.90 ULCERATIVE COLITIS, UNSPECIFIED, WITHOUT COMPLICATIONS: ICD-10-CM

## 2025-06-17 PROCEDURE — 96413 CHEMO IV INFUSION 1 HR: CPT | Performed by: STUDENT IN AN ORGANIZED HEALTH CARE EDUCATION/TRAINING PROGRAM

## 2025-06-17 PROCEDURE — 96415 CHEMO IV INFUSION ADDL HR: CPT | Performed by: STUDENT IN AN ORGANIZED HEALTH CARE EDUCATION/TRAINING PROGRAM

## 2025-06-17 NOTE — SERVICEHPINOTES
See follow up visit from  4/17/2025   .  brDate / Results of last TB test  4/17/2025   -   Negative  brLabs and/or Additional orders: CBC &amp CMP due in OctoberCMP due at 3rd infusion brInsurance authorization: [place] Skryizi 6/10/25-8/11/25([facility])
br Pharmacy:  [facility]  brRate of Infusion:  Standard   
br   Infusion order placed on:  5/19/2025   

br   Infusion Consent Date:  5/19/2025 ambulatory

## 2025-07-15 ENCOUNTER — OFFICE (OUTPATIENT)
Dept: URBAN - METROPOLITAN AREA CLINIC 11 | Facility: CLINIC | Age: 25
End: 2025-07-15
Payer: COMMERCIAL

## 2025-07-15 VITALS
TEMPERATURE: 98.2 F | SYSTOLIC BLOOD PRESSURE: 114 MMHG | HEART RATE: 67 BPM | SYSTOLIC BLOOD PRESSURE: 107 MMHG | DIASTOLIC BLOOD PRESSURE: 62 MMHG | SYSTOLIC BLOOD PRESSURE: 116 MMHG | HEART RATE: 72 BPM | RESPIRATION RATE: 18 BRPM | TEMPERATURE: 97.7 F | DIASTOLIC BLOOD PRESSURE: 71 MMHG | HEIGHT: 59 IN | SYSTOLIC BLOOD PRESSURE: 110 MMHG | HEART RATE: 71 BPM | SYSTOLIC BLOOD PRESSURE: 109 MMHG | DIASTOLIC BLOOD PRESSURE: 73 MMHG | DIASTOLIC BLOOD PRESSURE: 67 MMHG | HEART RATE: 65 BPM | HEART RATE: 70 BPM | DIASTOLIC BLOOD PRESSURE: 70 MMHG

## 2025-07-15 DIAGNOSIS — K51.90 ULCERATIVE COLITIS, UNSPECIFIED, WITHOUT COMPLICATIONS: ICD-10-CM

## 2025-07-15 PROCEDURE — 96413 CHEMO IV INFUSION 1 HR: CPT | Performed by: STUDENT IN AN ORGANIZED HEALTH CARE EDUCATION/TRAINING PROGRAM

## 2025-07-15 PROCEDURE — 96415 CHEMO IV INFUSION ADDL HR: CPT | Performed by: STUDENT IN AN ORGANIZED HEALTH CARE EDUCATION/TRAINING PROGRAM

## 2025-07-15 NOTE — SERVICEHPINOTES
See follow up visit from  4/17/2025   .  brDate / Results of last TB test  5/19/2025   -   Negative  brLabs and/or Additional orders: CBC &amp CMP due in October, CMP drawn today per order.brInsurance authorization: [place] Skryizi 6/10/25-8/11/25([facility])brPharmacy:  [facility]  brRate of Infusion:  Standard   
br   Infusion order placed on:  5/19/2025   

br   Infusion Consent Date:  5/19/2025

## 2025-07-23 ENCOUNTER — OFFICE (OUTPATIENT)
Dept: URBAN - METROPOLITAN AREA CLINIC 11 | Facility: CLINIC | Age: 25
End: 2025-07-23
Payer: COMMERCIAL

## 2025-07-23 VITALS
WEIGHT: 160 LBS | SYSTOLIC BLOOD PRESSURE: 133 MMHG | DIASTOLIC BLOOD PRESSURE: 94 MMHG | OXYGEN SATURATION: 98 % | HEART RATE: 59 BPM | HEIGHT: 59 IN

## 2025-07-23 DIAGNOSIS — Z87.19 PERSONAL HISTORY OF OTHER DISEASES OF THE DIGESTIVE SYSTEM: ICD-10-CM

## 2025-07-23 DIAGNOSIS — D50.9 IRON DEFICIENCY ANEMIA, UNSPECIFIED: ICD-10-CM

## 2025-07-23 DIAGNOSIS — K51.00 ULCERATIVE (CHRONIC) PANCOLITIS WITHOUT COMPLICATIONS: ICD-10-CM

## 2025-07-23 PROCEDURE — 99213 OFFICE O/P EST LOW 20 MIN: CPT | Performed by: STUDENT IN AN ORGANIZED HEALTH CARE EDUCATION/TRAINING PROGRAM

## 2025-07-23 NOTE — SERVICEHPINOTES
Ms. Nati Mayen is a 25-year-old female with PMH ulcerative colitis, who initially presented to gastro  with multiple GI issues, who presents today for ulcerative colitis followupclinic visit 02/09/2024:brthe patient reports that ever since Christmas she has had upper and lower abdominal pain as well as diarrhea after every time she eats. She has lost 10 pounds unintentionally. She endorses nausea but no vomiting, early satiety. She was started on pantoprazole for the last 3 weeks which has not helped much. She also failed using hyoscyamine by her primary care doctor. She also reports her most distressing symptom today is anorectal pain that is worse at night over the last 3 days. She also endorses blood in her stool every once in a while since Christmas, diarrhea after every meal as well as upper abdominal and lower abdominal cramping. She does not take any significant NSAIDs. No family history of GI malignancies or liver disease. No previous abdominal surgeries. She does not smoke, drink alcohol, or use recreational drugs. She was on antibiotics when her symptoms 1st started but those have since been discontinued.Clinic visit 02/29/2024:brpatient reports that after I saw her in clinic on February 9th she went to the emergency room at [facility]. There she had a CT scan which showed pan colitis but no abscess. She was seen by the GI service there and had stool testing done which was positive for C diff. She was admitted for 5 days and discharged after her symptoms improved. Since being in the hospital she has had improvement overall in her symptoms although she is still having about 6 loose bowel movements per day, less blood, still has some cramping but it is overall improved. She has having no fevers or chills. Some occasional nausea. She stopped using the anal fissure cream that I gave her last time and reports that she has not having significant pain when she has a bowel movement. She was also found to have iron-deficiency anemia in the hospital with a hemoglobin around 7 and low ferritin and iron levels so she was given IV iron. She was using Bentyl around the clock after discharge but had no improvement in her symptoms. She felt like a PPI is not helping her so she stopped taking it.EGD/colonoscopy 3/26/24:brWent on her cruise, felt better and diarrhea was less as she ate more, has been taking zofran and levsin around the clock on the quique. Father (who is also my patient) had emergency surgery for strangulated hernia right before the cruise and as a result, she has been under a lot of stress. Still has some low level LLQ pain.Clinic visit 5/29/2024:brPatient overall feels so much better now. She reports that she has not having any abdominal pain, bowel movements are once a day and solid and normal. No blood in the stool or black tarry stool. No early satiety. Her appetite is back to normal. Sometimes she will have some lower abdominal pain if she eats something spicy or greasy but she reports that is very minimal. Weight has been stable. She feels back to her normal self. She has not on any medications anymore. She never had the C diff test performed that I ordered several months ago but based on her symptoms it appears that it is resolved.Clinic Visit 2/26/2025:Iris patient began experiencing increased frequency of bowel movements about a month ago, accompanied by a sensation of pressure in the lower abdomen. Her stools are often liquid, though occasionally more solid, and she experiences urgency with bowel movements, sometimes resulting in only blood being passed. Two weeks ago, she noticed bright red blood during bowel movements, which are sometimes painful. Her abdominal pain is localized to the lower abdomen and sometimes improves after straining during bowel movements. She has a history of an anal fissure, which previously caused pain, and she is concerned it may still be an issue. Abdominal cramping has returned, particularly after eating, though not consistently. She had a previous episode of C. diff infection, which was treated with antibiotics, and she was symptom-free until about a month ago. She recently had contact with another patient who had C. diff, raising concerns about reinfection. She experiences nausea, particularly after eating, but no vomiting, reflux, or trouble swallowing. She exercises regularly, running five times a week, but notes that running sometimes exacerbates the pressure sensation, creating an urgent need to use the bathroom. clinic visit 04/17/2025:Iris patient was recently hospitalized for pancreatitis, suspected to be related to mesalamine use which she had recently been started on for her ulcerative colitis, which has since been discontinued. Lipase was 600 with normal LFTs.  No further significant stomach issues since discharge. During hospitalization, she experienced severe diarrhea, now resolved. Currently she is still taking a taper budesonide course with 4 weeks remaining. Bowel movements have returned to a more regular pattern, occurring twice daily and mostly formed, with occasional liquid consistency if certain foods are consumed. No blood in stool. She is concerned about the potential need for an ostomy bag in the future. 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 She is concerned about potential weight gain from medications, as she works hard to maintain her weight through regular exercise. She experiences occasional nausea after eating, sometimes severe enough to last the rest of the day. She uses Zofran 4 mg for nausea, but it has not been as effective as before. She denies dysphagia , but reports she experiences a sensation of something stuck in her throat, though it does not interfere with eating or cause coughing. An EGD in March2024 showed a stomach ulcer and gastritis, but a normal esophagus.
jenny alvarado    clinic visit 07/23/2025:
br 
  Patient reports that she overall feels well.  She has completed her 3rd infusion of Skyrizi and she is about to switch to the injections.  She is having about 1 bowel movement per day.  No bleeding.  Abdominal pain is much improved.  Nausea much improved.  She is not having to use the Zofran very often.  She has not had any recent hospitalizations or ER visits.  Her most recent CMP showed that her liver enzymes are normal. jenny alvarado

## 2025-07-23 NOTE — SERVICENOTES
Patient reports good control of her symptoms after initiating Skyrizi.  she is about to switch to the injections.  Will have her return to clinic in 6 months or sooner if needed.  All questions addressed.  We will recheck her fecal calprotectin at next visit.  I personally reviewed her labs which show her liver enzymes are normal.